# Patient Record
Sex: FEMALE | Race: WHITE | Employment: OTHER | URBAN - METROPOLITAN AREA
[De-identification: names, ages, dates, MRNs, and addresses within clinical notes are randomized per-mention and may not be internally consistent; named-entity substitution may affect disease eponyms.]

---

## 2020-06-11 ENCOUNTER — TELEPHONE ENCOUNTER (OUTPATIENT)
Dept: URBAN - METROPOLITAN AREA CLINIC 68 | Facility: CLINIC | Age: 60
End: 2020-06-11

## 2020-10-29 ENCOUNTER — TELEPHONE ENCOUNTER (OUTPATIENT)
Dept: URBAN - METROPOLITAN AREA CLINIC 68 | Facility: CLINIC | Age: 60
End: 2020-10-29

## 2020-11-06 ENCOUNTER — OFFICE VISIT (OUTPATIENT)
Dept: URBAN - METROPOLITAN AREA CLINIC 68 | Facility: CLINIC | Age: 60
End: 2020-11-06

## 2020-12-23 ENCOUNTER — TELEPHONE ENCOUNTER (OUTPATIENT)
Dept: URBAN - METROPOLITAN AREA CLINIC 68 | Facility: CLINIC | Age: 60
End: 2020-12-23

## 2020-12-30 ENCOUNTER — TELEPHONE ENCOUNTER (OUTPATIENT)
Dept: URBAN - METROPOLITAN AREA CLINIC 68 | Facility: CLINIC | Age: 60
End: 2020-12-30

## 2021-01-22 ENCOUNTER — OFFICE VISIT (OUTPATIENT)
Dept: URBAN - METROPOLITAN AREA CLINIC 68 | Facility: CLINIC | Age: 61
End: 2021-01-22

## 2021-02-12 LAB
ABSOLUTE BAND NEUTROPHILS: (no result)
ABSOLUTE BASOPHILS: (no result)
ABSOLUTE BLASTS: (no result)
ABSOLUTE EOSINOPHILS: (no result)
ABSOLUTE LYMPHOCYTES: (no result)
ABSOLUTE METAMYELOCYTES: (no result)
ABSOLUTE MONOCYTES: (no result)
ABSOLUTE MYELOCYTES: (no result)
ABSOLUTE NEUTROPHILS: (no result)
ABSOLUTE NUCLEATED RBC: (no result)
ABSOLUTE PROMYELOCYTES: (no result)
ALBUMIN/GLOBULIN RATIO: (no result)
ALBUMIN: (no result)
ALKALINE PHOSPHATASE: (no result)
ALPHA-1-ANTITRYPSIN QN: (no result)
ALT: (no result)
AST: (no result)
BAND NEUTROPHILS: (no result)
BASOPHILS: (no result)
BILIRUBIN, TOTAL: (no result)
BLASTS: (no result)
BUN/CREATININE RATIO: (no result)
CALCIUM: (no result)
CARBON DIOXIDE: (no result)
CHLORIDE: (no result)
COMMENT(S): (no result)
CREATININE: (no result)
EGFR AFRICAN AMERICAN: (no result)
EGFR NON-AFR. AMERICAN: (no result)
EOSINOPHILS: (no result)
GGT: (no result)
GLOBULIN: (no result)
GLUCOSE: (no result)
HEMATOCRIT: (no result)
HEMOGLOBIN: (no result)
HEREDITARY HEMOCHROMATOSIS DNA MUT: (no result)
IMMUNOGLOBULIN A: (no result)
INR: 1.1
INTERPRETATION: (no result)
LYMPHOCYTES: (no result)
MCH: (no result)
MCHC: (no result)
MCV: (no result)
METAMYELOCYTES: (no result)
MONOCYTES: (no result)
MPV: (no result)
MYELOCYTES: (no result)
NEUTROPHILS: (no result)
NUCLEATED RBC: (no result)
PLATELET COUNT: (no result)
POTASSIUM: (no result)
PROMYELOCYTES: (no result)
PROTEIN, TOTAL: (no result)
PT: (no result)
RDW: (no result)
REACTIVE LYMPHOCYTES: (no result)
RED BLOOD CELL COUNT: (no result)
SARS COV 2 AB IGG: NEGATIVE
SODIUM: (no result)
TISSUE TRANSGLUTAMINASE AB, IGA: (no result)
UREA NITROGEN (BUN): (no result)
WHITE BLOOD CELL COUNT: (no result)

## 2021-02-15 ENCOUNTER — TELEPHONE ENCOUNTER (OUTPATIENT)
Dept: URBAN - METROPOLITAN AREA CLINIC 68 | Facility: CLINIC | Age: 61
End: 2021-02-15

## 2021-02-16 ENCOUNTER — TELEPHONE ENCOUNTER (OUTPATIENT)
Dept: URBAN - METROPOLITAN AREA CLINIC 68 | Facility: CLINIC | Age: 61
End: 2021-02-16

## 2021-02-18 ENCOUNTER — LAB OUTSIDE AN ENCOUNTER (OUTPATIENT)
Dept: URBAN - METROPOLITAN AREA CLINIC 68 | Facility: CLINIC | Age: 61
End: 2021-02-18

## 2021-02-19 ENCOUNTER — TELEPHONE ENCOUNTER (OUTPATIENT)
Dept: URBAN - METROPOLITAN AREA CLINIC 68 | Facility: CLINIC | Age: 61
End: 2021-02-19

## 2021-02-19 ENCOUNTER — OFFICE VISIT (OUTPATIENT)
Dept: URBAN - METROPOLITAN AREA CLINIC 68 | Facility: CLINIC | Age: 61
End: 2021-02-19

## 2021-05-21 LAB
ALBUMIN/GLOBULIN RATIO: (no result)
ALBUMIN: (no result)
ALKALINE PHOSPHATASE: (no result)
ALT: (no result)
AST: (no result)
BILIRUBIN, TOTAL: (no result)
BUN/CREATININE RATIO: (no result)
CALCIUM: (no result)
CARBON DIOXIDE: (no result)
CHLORIDE: (no result)
CREATININE: (no result)
EGFR AFRICAN AMERICAN: (no result)
EGFR NON-AFR. AMERICAN: (no result)
GLOBULIN: (no result)
GLUCOSE: (no result)
POTASSIUM: (no result)
PROTEIN, TOTAL: (no result)
SODIUM: (no result)
UREA NITROGEN (BUN): (no result)

## 2021-05-23 ENCOUNTER — LAB OUTSIDE AN ENCOUNTER (OUTPATIENT)
Dept: URBAN - METROPOLITAN AREA CLINIC 68 | Facility: CLINIC | Age: 61
End: 2021-05-23

## 2021-05-24 ENCOUNTER — TELEPHONE ENCOUNTER (OUTPATIENT)
Dept: URBAN - METROPOLITAN AREA CLINIC 68 | Facility: CLINIC | Age: 61
End: 2021-05-24

## 2022-06-04 ENCOUNTER — TELEPHONE ENCOUNTER (OUTPATIENT)
Dept: URBAN - METROPOLITAN AREA CLINIC 68 | Facility: CLINIC | Age: 62
End: 2022-06-04

## 2022-06-04 RX ORDER — SPIRONOLACTONE 100 MG/1
SPIRONOLACTONE( 100MG ORAL 1 DAILY ) INACTIVE -HX ENTRY TABLET, FILM COATED ORAL DAILY
OUTPATIENT
Start: 2021-01-22

## 2022-06-04 RX ORDER — MESALAMINE 1000 MG/1
SUPPOSITORY RECTAL
Qty: 30 | Refills: 30 | OUTPATIENT
Start: 2013-04-12 | End: 2013-07-08

## 2022-06-04 RX ORDER — NITROGLYCERIN 4 MG/G
OINTMENT RECTAL
Qty: 1 | Refills: 1 | OUTPATIENT
Start: 2014-04-11 | End: 2017-06-21

## 2022-06-04 RX ORDER — AMLODIPINE BESYLATE 10 MG/1
AMLODIPINE BESYLATE( 10MG ORAL 1 DAILY ) INACTIVE -HX ENTRY TABLET ORAL DAILY
OUTPATIENT
Start: 2020-05-18

## 2022-06-04 RX ORDER — LORATADINE 5 MG/5 ML
CLARITIN( 10MG ORAL  DAILY ) INACTIVE -HX ENTRY SOLUTION, ORAL ORAL DAILY
OUTPATIENT
Start: 2017-06-21

## 2022-06-04 RX ORDER — MONTELUKAST SODIUM 10 MG/1
SINGULAIR( 10MG ORAL  DAILY ) INACTIVE -HX ENTRY TABLET, FILM COATED ORAL DAILY
OUTPATIENT
Start: 2013-07-08

## 2022-06-04 RX ORDER — CHOLESTYRAMINE 4 G/5.5G
CHOLESTYRAMINE LIGHT( 4GM ORAL  DAILY ) INACTIVE -HX ENTRY POWDER, FOR SUSPENSION ORAL DAILY
OUTPATIENT
Start: 2014-04-11

## 2022-06-04 RX ORDER — COLESEVELAM HYDROCHLORIDE 625 MG/1
TABLET, FILM COATED ORAL
Qty: 60 | Refills: 60 | OUTPATIENT
Start: 2018-04-30 | End: 2020-04-27

## 2022-06-04 RX ORDER — CHOLESTYRAMINE 4 G/9G
POWDER, FOR SUSPENSION ORAL
Qty: 1 | Refills: 1 | OUTPATIENT
Start: 2013-11-06 | End: 2017-06-21

## 2022-06-04 RX ORDER — VANCOMYCIN HYDROCHLORIDE 125 MG/1
CAPSULE ORAL
Qty: 56 | Refills: 0 | OUTPATIENT
Start: 2020-05-03 | End: 2020-05-17

## 2022-06-04 RX ORDER — LIDOCAINE 50 MG/G
CREAM TOPICAL AS NEEDED
Qty: 1 | Refills: 0 | OUTPATIENT
Start: 2014-04-11 | End: 2017-06-21

## 2022-06-04 RX ORDER — DOCUSATE SODIUM 100 MG/1
COLACE( 100MG ORAL 2 PRN ) INACTIVE -HX ENTRY CAPSULE ORAL PRN
OUTPATIENT
Start: 2017-06-21

## 2022-06-04 RX ORDER — COLESEVELAM HYDROCHLORIDE 625 MG/1
TABLET, FILM COATED ORAL
Qty: 60 | Refills: 60 | OUTPATIENT
Start: 2013-07-08 | End: 2013-11-06

## 2022-06-04 RX ORDER — LORATADINE 5 MG/5 ML
CLARITIN( 10MG ORAL  DAILY ) INACTIVE -HX ENTRY SOLUTION, ORAL ORAL DAILY
OUTPATIENT
Start: 2013-07-08

## 2022-06-04 RX ORDER — DILTIAZEM HCL
POWDER (GRAM) MISCELLANEOUS
Qty: 0 | Refills: 0 | OUTPATIENT
Start: 2013-11-06 | End: 2014-04-11

## 2022-06-04 RX ORDER — MONTELUKAST SODIUM 10 MG/1
MONTELUKAST SODIUM( 10MG ORAL 1 DAILY ) INACTIVE -HX ENTRY TABLET, FILM COATED ORAL DAILY
OUTPATIENT
Start: 2021-01-22

## 2022-06-04 RX ORDER — PSEUDOEPHEDRINE HYDROCHLORIDE 120 MG/1
SUDAFED 12 HOUR( 120MG ORAL  AS NEEDED ) INACTIVE -HX ENTRY TABLET, FILM COATED, EXTENDED RELEASE ORAL AS NEEDED
OUTPATIENT
Start: 2017-06-21

## 2022-06-04 RX ORDER — COLESEVELAM HYDROCHLORIDE 625 MG/1
TABLET, FILM COATED ORAL
Qty: 60 | Refills: 60 | OUTPATIENT
Start: 2020-04-27 | End: 2021-01-22

## 2022-06-04 RX ORDER — ESCITALOPRAM 10 MG/1
ESCITALOPRAM OXALATE( 10MG ORAL 1 DAILY ) INACTIVE -HX ENTRY TABLET, FILM COATED ORAL DAILY
OUTPATIENT
Start: 2021-01-22

## 2022-06-04 RX ORDER — MONTELUKAST SODIUM 10 MG/1
SINGULAIR( 10MG ORAL  DAILY ) INACTIVE -HX ENTRY TABLET, FILM COATED ORAL DAILY
OUTPATIENT
Start: 2017-06-21

## 2022-06-05 ENCOUNTER — TELEPHONE ENCOUNTER (OUTPATIENT)
Dept: URBAN - METROPOLITAN AREA CLINIC 68 | Facility: CLINIC | Age: 62
End: 2022-06-05

## 2022-06-05 RX ORDER — SAW/VIT E/SOD SEL/LYC/BETA/PYG 160-100
PROBIOTIC & ACIDOPHILUS EX ST(  ORAL  DAILY ) ACTIVE -HX ENTRY TABLET ORAL DAILY
Status: ACTIVE | COMMUNITY
Start: 2021-02-19

## 2022-06-25 ENCOUNTER — TELEPHONE ENCOUNTER (OUTPATIENT)
Age: 62
End: 2022-06-25

## 2022-06-25 RX ORDER — LORATADINE 5 MG/5 ML
CLARITIN( 10MG ORAL  DAILY ) INACTIVE -HX ENTRY SOLUTION, ORAL ORAL DAILY
OUTPATIENT
Start: 2017-06-21

## 2022-06-25 RX ORDER — HYDROCORTISONE ACETATE AND PRAMOXINE HYDROCHLORIDE 25; 10 MG/G; MG/G
ANALPRAM-HC( 1-2.5% RECTAL  THREE TIMES A DAY AS NEEDED ) INACTIVE -HX ENTRY CREAM TOPICAL
OUTPATIENT
Start: 2013-07-08

## 2022-06-25 RX ORDER — CLOTRIMAZOLE 1% ANTIFUNGAL CREAM 1 G/100G
CREAM TOPICAL PRN
Qty: 14 | Refills: 14 | OUTPATIENT
Start: 2014-01-08 | End: 2017-06-21

## 2022-06-25 RX ORDER — MONTELUKAST SODIUM 10 MG/1
SINGULAIR( 10MG ORAL  DAILY ) INACTIVE -HX ENTRY TABLET, FILM COATED ORAL DAILY
OUTPATIENT
Start: 2017-06-21

## 2022-06-25 RX ORDER — COLESEVELAM HYDROCHLORIDE 625 MG/1
TABLET, FILM COATED ORAL
Qty: 60 | Refills: 60 | OUTPATIENT
Start: 2018-04-30 | End: 2020-04-27

## 2022-06-25 RX ORDER — LORATADINE 5 MG/5 ML
CLARITIN( 10MG ORAL  DAILY ) INACTIVE -HX ENTRY SOLUTION, ORAL ORAL DAILY
OUTPATIENT
Start: 2013-07-08

## 2022-06-25 RX ORDER — ESCITALOPRAM 10 MG/1
ESCITALOPRAM OXALATE( 10MG ORAL 1 DAILY ) INACTIVE -HX ENTRY TABLET, FILM COATED ORAL DAILY
OUTPATIENT
Start: 2021-01-22

## 2022-06-25 RX ORDER — DOCUSATE SODIUM 100 MG/1
COLACE( 100MG ORAL 2 PRN ) INACTIVE -HX ENTRY CAPSULE ORAL PRN
OUTPATIENT
Start: 2017-06-21

## 2022-06-25 RX ORDER — LIDOCAINE 50 MG/G
CREAM TOPICAL AS NEEDED
Qty: 1 | Refills: 0 | OUTPATIENT
Start: 2014-04-11 | End: 2017-06-21

## 2022-06-25 RX ORDER — VANCOMYCIN HYDROCHLORIDE 125 MG/1
CAPSULE ORAL
Qty: 56 | Refills: 0 | OUTPATIENT
Start: 2020-05-03 | End: 2020-05-17

## 2022-06-25 RX ORDER — MONTELUKAST SODIUM 10 MG/1
SINGULAIR( 10MG ORAL  DAILY ) INACTIVE -HX ENTRY TABLET, FILM COATED ORAL DAILY
OUTPATIENT
Start: 2013-07-08

## 2022-06-25 RX ORDER — NITROGLYCERIN 4 MG/G
OINTMENT RECTAL
Qty: 1 | Refills: 1 | OUTPATIENT
Start: 2014-04-11 | End: 2017-06-21

## 2022-06-25 RX ORDER — COLESEVELAM HYDROCHLORIDE 625 MG/1
TABLET, COATED ORAL
Qty: 60 | Refills: 60 | OUTPATIENT
Start: 2020-04-27 | End: 2021-01-22

## 2022-06-25 RX ORDER — CHOLESTYRAMINE 4 G/5.5G
CHOLESTYRAMINE LIGHT( 4GM ORAL  DAILY ) INACTIVE -HX ENTRY POWDER, FOR SUSPENSION ORAL DAILY
OUTPATIENT
Start: 2014-04-11

## 2022-06-25 RX ORDER — CHOLESTYRAMINE 4 G/9G
POWDER, FOR SUSPENSION ORAL
Qty: 1 | Refills: 1 | OUTPATIENT
Start: 2013-11-06 | End: 2017-06-21

## 2022-06-25 RX ORDER — SPIRONOLACTONE 100 MG/1
SPIRONOLACTONE( 100MG ORAL 1 DAILY ) INACTIVE -HX ENTRY TABLET, COATED ORAL DAILY
OUTPATIENT
Start: 2021-01-22

## 2022-06-25 RX ORDER — MONTELUKAST 10 MG/1
MONTELUKAST SODIUM( 10MG ORAL 1 DAILY ) INACTIVE -HX ENTRY TABLET, FILM COATED ORAL DAILY
OUTPATIENT
Start: 2021-01-22

## 2022-06-25 RX ORDER — AMLODIPINE BESYLATE 10 MG/1
AMLODIPINE BESYLATE( 10MG ORAL 1 DAILY ) INACTIVE -HX ENTRY TABLET ORAL DAILY
OUTPATIENT
Start: 2020-05-18

## 2022-06-25 RX ORDER — MESALAMINE 1000 MG/1
SUPPOSITORY RECTAL
Qty: 30 | Refills: 30 | OUTPATIENT
Start: 2013-04-12 | End: 2013-07-08

## 2022-06-25 RX ORDER — SODIUM SULFATE, POTASSIUM SULFATE, MAGNESIUM SULFATE 17.5; 3.13; 1.6 G/ML; G/ML; G/ML
SOLUTION, CONCENTRATE ORAL AS DIRECTED
Qty: 1 | Refills: 0 | OUTPATIENT
Start: 2014-04-11 | End: 2017-06-21

## 2022-06-25 RX ORDER — ANORECTAL OINTMENT 15.7; .44; 24; 20.6 G/100G; G/100G; G/100G; G/100G
OINTMENT TOPICAL
Qty: 14 | Refills: 0 | OUTPATIENT
Start: 2014-01-08 | End: 2014-01-22

## 2022-06-25 RX ORDER — COLESEVELAM HYDROCHLORIDE 625 MG/1
TABLET, FILM COATED ORAL
Qty: 60 | Refills: 60 | OUTPATIENT
Start: 2013-07-08 | End: 2013-11-06

## 2022-06-26 ENCOUNTER — TELEPHONE ENCOUNTER (OUTPATIENT)
Age: 62
End: 2022-06-26

## 2022-06-26 RX ORDER — SAW/VIT E/SOD SEL/LYC/BETA/PYG 160-100
PROBIOTIC & ACIDOPHILUS EX ST(  ORAL  DAILY ) ACTIVE -HX ENTRY TABLET ORAL DAILY
Status: ACTIVE | COMMUNITY
Start: 2021-02-19

## 2023-01-13 ENCOUNTER — OFFICE VISIT (OUTPATIENT)
Dept: URBAN - METROPOLITAN AREA CLINIC 68 | Facility: CLINIC | Age: 63
End: 2023-01-13

## 2023-01-13 RX ORDER — LEVOTHYROXINE, LIOTHYRONINE 19; 4.5 UG/1; UG/1
1 TABLET ON AN EMPTY STOMACH TABLET ORAL ONCE A DAY
Status: ACTIVE | COMMUNITY

## 2023-01-13 RX ORDER — SAW/VIT E/SOD SEL/LYC/BETA/PYG 160-100
PROBIOTIC & ACIDOPHILUS EX ST(  ORAL  DAILY ) ACTIVE -HX ENTRY TABLET ORAL DAILY
Status: ACTIVE | COMMUNITY
Start: 2021-02-19

## 2023-01-13 RX ORDER — SPIRONOLACTONE 100 MG/1
1 TABLET TABLET, FILM COATED ORAL ONCE A DAY
Status: ACTIVE | COMMUNITY

## 2023-01-13 NOTE — EXAM-MIGRATED EXAMINATIONS
General Examination: General appearance: -> alert, pleasant, well-nourished and in no acute distress   Head: -> normocephalic, atraumatic   Eyes: -> normal   Neck / thyroid: -> neck is supple, with full range of motion and no cervical lymphadenopathy   Skin: -> skin is warm and dry, with no rashes, good skin turgor and normal hair distribution   Heart: -> regular rate and rhythm without murmurs, gallops, clicks or rubs   Lungs: -> clear to auscultation bilaterally, with good air movement and no rales, rhonchi or wheezes   Chest: -> chest wall with no costochondral junction tenderness, no rib deformity and normal shape and expansion   Abdomen: -> soft with good bowel sounds, nontender, and no masses or hepatosplenomegaly , negative Park's sign   Extremities: -> normal extremity with no clubbing, cyanosis or edema   Neurologic: -> alert and oriented

## 2023-01-30 ENCOUNTER — OFFICE VISIT (OUTPATIENT)
Dept: URBAN - METROPOLITAN AREA SURGERY CENTER 12 | Facility: SURGERY CENTER | Age: 63
End: 2023-01-30

## 2023-01-30 ENCOUNTER — DASHBOARD ENCOUNTERS (OUTPATIENT)
Age: 63
End: 2023-01-30

## 2023-01-30 RX ORDER — SPIRONOLACTONE 100 MG/1
1 TABLET TABLET, FILM COATED ORAL ONCE A DAY
Status: ACTIVE | COMMUNITY

## 2023-01-30 RX ORDER — SAW/VIT E/SOD SEL/LYC/BETA/PYG 160-100
PROBIOTIC & ACIDOPHILUS EX ST(  ORAL  DAILY ) ACTIVE -HX ENTRY TABLET ORAL DAILY
Status: ACTIVE | COMMUNITY
Start: 2021-02-19

## 2023-01-30 RX ORDER — LEVOTHYROXINE, LIOTHYRONINE 19; 4.5 UG/1; UG/1
1 TABLET ON AN EMPTY STOMACH TABLET ORAL ONCE A DAY
Status: ACTIVE | COMMUNITY

## 2023-02-02 ENCOUNTER — TELEPHONE ENCOUNTER (OUTPATIENT)
Dept: URBAN - METROPOLITAN AREA CLINIC 68 | Facility: CLINIC | Age: 63
End: 2023-02-02